# Patient Record
Sex: MALE | Race: WHITE | NOT HISPANIC OR LATINO | Employment: OTHER | URBAN - NONMETROPOLITAN AREA
[De-identification: names, ages, dates, MRNs, and addresses within clinical notes are randomized per-mention and may not be internally consistent; named-entity substitution may affect disease eponyms.]

---

## 2024-08-06 ENCOUNTER — APPOINTMENT (EMERGENCY)
Dept: RADIOLOGY | Facility: HOSPITAL | Age: 69
End: 2024-08-06
Payer: COMMERCIAL

## 2024-08-06 ENCOUNTER — HOSPITAL ENCOUNTER (EMERGENCY)
Facility: HOSPITAL | Age: 69
Discharge: HOME | End: 2024-08-06
Attending: EMERGENCY MEDICINE
Payer: COMMERCIAL

## 2024-08-06 VITALS
WEIGHT: 230.6 LBS | DIASTOLIC BLOOD PRESSURE: 82 MMHG | TEMPERATURE: 97.9 F | HEART RATE: 58 BPM | RESPIRATION RATE: 25 BRPM | OXYGEN SATURATION: 95 % | SYSTOLIC BLOOD PRESSURE: 170 MMHG

## 2024-08-06 DIAGNOSIS — T78.40XA ALLERGIC REACTION, INITIAL ENCOUNTER: Primary | ICD-10-CM

## 2024-08-06 LAB
2HR DELTA HS TROPONIN: 3 NG/L
ALBUMIN SERPL BCG-MCNC: 4.2 G/DL (ref 3.5–5)
ALP SERPL-CCNC: 243 U/L (ref 34–104)
ALT SERPL W P-5'-P-CCNC: 24 U/L (ref 7–52)
ANION GAP SERPL CALCULATED.3IONS-SCNC: 8 MMOL/L (ref 4–13)
AST SERPL W P-5'-P-CCNC: 26 U/L (ref 13–39)
ATRIAL RATE: 63 BPM
BASOPHILS # BLD AUTO: 0.01 THOUSANDS/ÂΜL (ref 0–0.1)
BASOPHILS NFR BLD AUTO: 0 % (ref 0–1)
BILIRUB SERPL-MCNC: 0.87 MG/DL (ref 0.2–1)
BNP SERPL-MCNC: 410 PG/ML (ref 0–100)
BUN SERPL-MCNC: 19 MG/DL (ref 5–25)
CALCIUM SERPL-MCNC: 9.1 MG/DL (ref 8.4–10.2)
CARDIAC TROPONIN I PNL SERPL HS: 11 NG/L
CARDIAC TROPONIN I PNL SERPL HS: 14 NG/L
CHLORIDE SERPL-SCNC: 106 MMOL/L (ref 96–108)
CO2 SERPL-SCNC: 24 MMOL/L (ref 21–32)
CREAT SERPL-MCNC: 1 MG/DL (ref 0.6–1.3)
EOSINOPHIL # BLD AUTO: 0.01 THOUSAND/ÂΜL (ref 0–0.61)
EOSINOPHIL NFR BLD AUTO: 0 % (ref 0–6)
ERYTHROCYTE [DISTWIDTH] IN BLOOD BY AUTOMATED COUNT: 18.6 % (ref 11.6–15.1)
GFR SERPL CREATININE-BSD FRML MDRD: 75 ML/MIN/1.73SQ M
GLUCOSE SERPL-MCNC: 123 MG/DL (ref 65–140)
HCT VFR BLD AUTO: 36.9 % (ref 36.5–49.3)
HGB BLD-MCNC: 11.3 G/DL (ref 12–17)
IMM GRANULOCYTES # BLD AUTO: 0.04 THOUSAND/UL (ref 0–0.2)
IMM GRANULOCYTES NFR BLD AUTO: 1 % (ref 0–2)
LYMPHOCYTES # BLD AUTO: 0.37 THOUSANDS/ÂΜL (ref 0.6–4.47)
LYMPHOCYTES NFR BLD AUTO: 13 % (ref 14–44)
MCH RBC QN AUTO: 27.9 PG (ref 26.8–34.3)
MCHC RBC AUTO-ENTMCNC: 30.6 G/DL (ref 31.4–37.4)
MCV RBC AUTO: 91 FL (ref 82–98)
MONOCYTES # BLD AUTO: 0.09 THOUSAND/ÂΜL (ref 0.17–1.22)
MONOCYTES NFR BLD AUTO: 3 % (ref 4–12)
NEUTROPHILS # BLD AUTO: 2.37 THOUSANDS/ÂΜL (ref 1.85–7.62)
NEUTS SEG NFR BLD AUTO: 83 % (ref 43–75)
NRBC BLD AUTO-RTO: 0 /100 WBCS
P AXIS: 67 DEGREES
PLATELET # BLD AUTO: 158 THOUSANDS/UL (ref 149–390)
PMV BLD AUTO: 11 FL (ref 8.9–12.7)
POTASSIUM SERPL-SCNC: 4.4 MMOL/L (ref 3.5–5.3)
PR INTERVAL: 210 MS
PROT SERPL-MCNC: 6.9 G/DL (ref 6.4–8.4)
QRS AXIS: -13 DEGREES
QRSD INTERVAL: 148 MS
QT INTERVAL: 496 MS
QTC INTERVAL: 507 MS
RBC # BLD AUTO: 4.05 MILLION/UL (ref 3.88–5.62)
SODIUM SERPL-SCNC: 138 MMOL/L (ref 135–147)
T WAVE AXIS: 40 DEGREES
VENTRICULAR RATE: 63 BPM
WBC # BLD AUTO: 2.89 THOUSAND/UL (ref 4.31–10.16)

## 2024-08-06 PROCEDURE — 99284 EMERGENCY DEPT VISIT MOD MDM: CPT

## 2024-08-06 PROCEDURE — 80053 COMPREHEN METABOLIC PANEL: CPT | Performed by: PHYSICIAN ASSISTANT

## 2024-08-06 PROCEDURE — 96374 THER/PROPH/DIAG INJ IV PUSH: CPT

## 2024-08-06 PROCEDURE — 83880 ASSAY OF NATRIURETIC PEPTIDE: CPT | Performed by: PHYSICIAN ASSISTANT

## 2024-08-06 PROCEDURE — 99284 EMERGENCY DEPT VISIT MOD MDM: CPT | Performed by: PHYSICIAN ASSISTANT

## 2024-08-06 PROCEDURE — 84484 ASSAY OF TROPONIN QUANT: CPT | Performed by: PHYSICIAN ASSISTANT

## 2024-08-06 PROCEDURE — 93005 ELECTROCARDIOGRAM TRACING: CPT

## 2024-08-06 PROCEDURE — 71045 X-RAY EXAM CHEST 1 VIEW: CPT

## 2024-08-06 PROCEDURE — 85025 COMPLETE CBC W/AUTO DIFF WBC: CPT | Performed by: PHYSICIAN ASSISTANT

## 2024-08-06 RX ORDER — PREDNISONE 20 MG/1
40 TABLET ORAL DAILY
Qty: 6 TABLET | Refills: 0 | Status: SHIPPED | OUTPATIENT
Start: 2024-08-06 | End: 2024-08-09

## 2024-08-06 RX ORDER — METHYLPREDNISOLONE SODIUM SUCCINATE 125 MG/2ML
125 INJECTION, POWDER, LYOPHILIZED, FOR SOLUTION INTRAMUSCULAR; INTRAVENOUS ONCE
Status: COMPLETED | OUTPATIENT
Start: 2024-08-06 | End: 2024-08-06

## 2024-08-06 RX ADMIN — METHYLPREDNISOLONE SODIUM SUCCINATE 125 MG: 125 INJECTION, POWDER, FOR SOLUTION INTRAMUSCULAR; INTRAVENOUS at 14:01

## 2024-08-06 NOTE — ED NOTES
Port de-accessed by SUSANNE Yepez prior to discharge.   Flushing well and giving good blood return.       Terence Elizondo  08/06/24 [id number]

## 2024-08-06 NOTE — ED PROVIDER NOTES
"History  Chief Complaint   Patient presents with    Allergic Reaction     Pt received a new chemo medication and went in to respiratory distress. Cancer center administered benadryl, hydrocortisone,and Pepcid. EMS gave albuterol in route and patient reports relief.      The patient is a 69-year-old male with a current history of colon cancer with liver metastasis, A-fib on Eliquis who presents with the chief complaint of shortness of breath and allergic reaction.  The patient states that he was at his hematology oncology appointment today and started a new chemo infusion, \"Vectibix\", this started at 1125 and finished at 1225.  He states that he started coughing during the infusion and it got worse throughout the treatment.  He started to become short of breath during this treatment and nursing staff gave Benadryl hydrocortisone IV Pepcid and EMS was called.  Had a lot of improvement from the medications.  Denies any fevers chills chest pain tongue or mouth swelling rash, abdominal pain nausea vomiting.          None       Past Medical History:   Diagnosis Date    Afib (HCC)     Colon cancer (HCC)     Hydrocele     Hypertension        Past Surgical History:   Procedure Laterality Date    COLECTOMY      PORTACATH PLACEMENT         History reviewed. No pertinent family history.  I have reviewed and agree with the history as documented.    E-Cigarette/Vaping     E-Cigarette/Vaping Substances     Social History     Tobacco Use    Smoking status: Never    Smokeless tobacco: Never   Substance Use Topics    Alcohol use: Yes    Drug use: Not Currently       Review of Systems   All other systems reviewed and are negative.      Physical Exam  Physical Exam  Vitals and nursing note reviewed.   Constitutional:       General: He is not in acute distress.     Appearance: He is well-developed.   HENT:      Head: Normocephalic and atraumatic.      Mouth/Throat:      Mouth: Oropharynx is clear and moist.   Eyes:      Extraocular " Movements: EOM normal.      Pupils: Pupils are equal, round, and reactive to light.   Cardiovascular:      Rate and Rhythm: Normal rate and regular rhythm.      Heart sounds: No murmur heard.  Pulmonary:      Effort: Pulmonary effort is normal. No respiratory distress.      Breath sounds: Normal breath sounds.   Abdominal:      General: Bowel sounds are normal.      Palpations: Abdomen is soft.      Tenderness: There is no abdominal tenderness.   Musculoskeletal:      Cervical back: Normal range of motion.   Skin:     General: Skin is warm and dry.      Capillary Refill: Capillary refill takes less than 2 seconds.   Neurological:      General: No focal deficit present.      Mental Status: He is alert and oriented to person, place, and time.   Psychiatric:         Mood and Affect: Mood and affect normal.         Behavior: Behavior normal.         Vital Signs  ED Triage Vitals   Temperature Pulse Respirations Blood Pressure SpO2   08/06/24 1331 08/06/24 1331 08/06/24 1331 08/06/24 1331 08/06/24 1331   97.9 °F (36.6 °C) 62 (!) 30 (!) 233/102 92 %      Temp Source Heart Rate Source Patient Position - Orthostatic VS BP Location FiO2 (%)   08/06/24 1331 08/06/24 1331 08/06/24 1400 08/06/24 1331 --   Temporal Monitor Lying Right arm       Pain Score       --                  Vitals:    08/06/24 1331 08/06/24 1400 08/06/24 1430 08/06/24 1448   BP: (!) 233/102 (!) 229/115 (!) 220/83 170/82   Pulse: 62 61 58 58   Patient Position - Orthostatic VS:  Lying  Sitting         Visual Acuity      ED Medications  Medications   methylPREDNISolone sodium succinate (Solu-MEDROL) injection 125 mg (125 mg Intravenous Given 8/6/24 1401)       Diagnostic Studies  Results Reviewed       Procedure Component Value Units Date/Time    HS Troponin I 2hr [[id number]]  (Normal) Collected: 08/06/24 1508    Lab Status: Final result Specimen: Blood from Line Updated: 08/06/24 1534     hs TnI 2hr 14 ng/L      Delta 2hr hsTnI 3 ng/L     B-Type  Natriuretic Peptide(BNP) [[id number]]  (Abnormal) Collected: 08/06/24 1356    Lab Status: Final result Specimen: Blood from Line Updated: 08/06/24 1455      pg/mL     HS Troponin 0hr (reflex protocol) [[id number]]  (Normal) Collected: 08/06/24 1356    Lab Status: Final result Specimen: Blood from Line Updated: 08/06/24 1425     hs TnI 0hr 11 ng/L     Comprehensive metabolic panel [[id number]]  (Abnormal) Collected: 08/06/24 1356    Lab Status: Final result Specimen: Blood from Line Updated: 08/06/24 1420     Sodium 138 mmol/L      Potassium 4.4 mmol/L      Chloride 106 mmol/L      CO2 24 mmol/L      ANION GAP 8 mmol/L      BUN 19 mg/dL      Creatinine 1.00 mg/dL      Glucose 123 mg/dL      Calcium 9.1 mg/dL      AST 26 U/L      ALT 24 U/L      Alkaline Phosphatase 243 U/L      Total Protein 6.9 g/dL      Albumin 4.2 g/dL      Total Bilirubin 0.87 mg/dL      eGFR 75 ml/min/1.73sq m     Narrative:      [employer] guidelines for Chronic Kidney Disease (CKD):     Stage 1 with normal or high GFR (GFR > 90 mL/min/1.73 square meters)    Stage 2 Mild CKD (GFR = 60-89 mL/min/1.73 square meters)    Stage 3A Moderate CKD (GFR = 45-59 mL/min/1.73 square meters)    Stage 3B Moderate CKD (GFR = 30-44 mL/min/1.73 square meters)    Stage 4 Severe CKD (GFR = 15-29 mL/min/1.73 square meters)    Stage 5 End Stage CKD (GFR <15 mL/min/1.73 square meters)  Note: GFR calculation is accurate only with a steady state creatinine    CBC and differential [[id number]]  (Abnormal) Collected: 08/06/24 1356    Lab Status: Final result Specimen: Blood from Line Updated: 08/06/24 1403     WBC 2.89 Thousand/uL      RBC 4.05 Million/uL      Hemoglobin 11.3 g/dL      Hematocrit 36.9 %      MCV 91 fL      MCH 27.9 pg      MCHC 30.6 g/dL      RDW 18.6 %      MPV 11.0 fL      Platelets 158 Thousands/uL      nRBC 0 /100 WBCs      Segmented % 83 %      Immature Grans % 1 %      Lymphocytes % 13 %      Monocytes % 3 %       "Eosinophils Relative 0 %      Basophils Relative 0 %      Absolute Neutrophils 2.37 Thousands/µL      Absolute Immature Grans 0.04 Thousand/uL      Absolute Lymphocytes 0.37 Thousands/µL      Absolute Monocytes 0.09 Thousand/µL      Eosinophils Absolute 0.01 Thousand/µL      Basophils Absolute 0.01 Thousands/µL                    XR chest 1 view portable   Final Result by David Arias MD (08/06 4026)      Mild cardiomegaly. No acute cardiopulmonary disease.            Workstation performed: [id number]                    Procedures  ECG 12 Lead Documentation Only    Date/Time: 8/6/2024 8:00 PM    Performed by: Saman Syed PA-C  Authorized by: Saman Syed PA-C    Indications / Diagnosis:  Zsob  ECG reviewed by me, the ED Provider: yes    Patient location:  ED  Previous ECG:     Previous ECG:  Unavailable  Interpretation:     Interpretation: abnormal    Rate:     ECG rate:  63    ECG rate assessment: normal    Rhythm:     Rhythm: sinus rhythm    Conduction:     Conduction: abnormal      Abnormal conduction: 1st degree    ST segments:     ST segments:  Normal           ED Course                                               Medical Decision Making  The patient is a 69-year-old male with a current history of colon cancer with liver metastasis, A-fib on Eliquis who presents with the chief complaint of shortness of breath and allergic reaction.  The patient states that he was at his hematology oncology appointment today and started a new chemo infusion, \"Vectibix\", this started at 1125 and finished at 1225.  He states that he started coughing during the infusion and it got worse throughout the treatment.  He started to become short of breath during this treatment and nursing staff gave Benadryl hydrocortisone IV Pepcid and EMS was called.  Had a lot of improvement from the medications.  Denies any fevers chills chest pain tongue or mouth swelling rash, abdominal pain nausea vomiting.    The patient " remains stable Emergency Department had improvement of symptoms upon arrival after medication    Patient's blood pressure was better on the manual blood pressure cuff.  Patient was monitored in the emergency department, the troponin was obtained x 2 unremarkable no changes.  Patient's condition similar to allergic reaction will continue small steroids prescription and patient in agreement with treatment plan.    Amount and/or Complexity of Data Reviewed  Labs: ordered. Decision-making details documented in ED Course.  Radiology: ordered and independent interpretation performed. Decision-making details documented in ED Course.  ECG/medicine tests: ordered and independent interpretation performed. Decision-making details documented in ED Course.    Risk  Prescription drug management.                 Disposition  Final diagnoses:   Allergic reaction, initial encounter     Time reflects when diagnosis was documented in both MDM as applicable and the Disposition within this note       Time User Action Codes Description Comment    8/6/2024  3:00 PM Saman Syed Add [T78.40XA] Allergic reaction, initial encounter           ED Disposition       ED Disposition   Discharge    Condition   Stable    Date/Time   Tue Aug 6, 2024  3:00 PM    Comment   Chucho Stephens discharge to home/self care.                   Follow-up Information    None         Discharge Medication List as of 8/6/2024  3:42 PM        START taking these medications    Details   predniSONE 20 mg tablet Take 2 tablets (40 mg total) by mouth daily for 3 days, Starting Tue 8/6/2024, Until Fri 8/9/2024, Normal             No discharge procedures on file.    PDMP Review       None            ED Provider  Electronically Signed by             Saman Syed PA-C  08/06/24 2001

## 2024-08-06 NOTE — ED NOTES
Patients port already accessed upon arrival. Fluids running via port upon arrival. Per provider okay to use at this time.      Terence Elizondo  08/06/24 5472

## 2025-06-04 ENCOUNTER — HOSPITAL ENCOUNTER (OUTPATIENT)
Dept: CT IMAGING | Facility: HOSPITAL | Age: 70
Discharge: HOME | End: 2025-06-04
Attending: REGISTERED NURSE
Payer: COMMERCIAL

## 2025-06-04 ENCOUNTER — APPOINTMENT (OUTPATIENT)
Dept: LAB | Facility: HOSPITAL | Age: 70
End: 2025-06-04
Payer: COMMERCIAL

## 2025-06-04 DIAGNOSIS — C20 PRIMARY MALIGNANT NEOPLASM OF RECTUM (HCC): ICD-10-CM

## 2025-06-04 DIAGNOSIS — R06.02 SOB (SHORTNESS OF BREATH): ICD-10-CM

## 2025-06-04 DIAGNOSIS — C18.9 MALIGNANT NEOPLASM OF COLON, UNSPECIFIED PART OF COLON (HCC): ICD-10-CM

## 2025-06-04 DIAGNOSIS — D50.9 IRON DEFICIENCY ANEMIA, UNSPECIFIED IRON DEFICIENCY ANEMIA TYPE: ICD-10-CM

## 2025-06-04 DIAGNOSIS — C18.9 COLON CANCER METASTASIZED TO LIVER (HCC): ICD-10-CM

## 2025-06-04 DIAGNOSIS — C78.7 COLON CANCER METASTASIZED TO LIVER (HCC): ICD-10-CM

## 2025-06-04 LAB
ALBUMIN SERPL BCG-MCNC: 3 G/DL (ref 3.5–5)
ALP SERPL-CCNC: 245 U/L (ref 34–104)
ALT SERPL W P-5'-P-CCNC: 23 U/L (ref 7–52)
ANION GAP SERPL CALCULATED.3IONS-SCNC: 6 MMOL/L (ref 4–13)
AST SERPL W P-5'-P-CCNC: 44 U/L (ref 13–39)
BASOPHILS # BLD AUTO: 0.01 THOUSANDS/ÂΜL (ref 0–0.1)
BASOPHILS NFR BLD AUTO: 0 % (ref 0–1)
BILIRUB SERPL-MCNC: 1.69 MG/DL (ref 0.2–1)
BUN SERPL-MCNC: 21 MG/DL (ref 5–25)
CALCIUM ALBUM COR SERPL-MCNC: 9.1 MG/DL (ref 8.3–10.1)
CALCIUM SERPL-MCNC: 8.3 MG/DL (ref 8.4–10.2)
CHLORIDE SERPL-SCNC: 111 MMOL/L (ref 96–108)
CO2 SERPL-SCNC: 26 MMOL/L (ref 21–32)
CREAT SERPL-MCNC: 0.91 MG/DL (ref 0.6–1.3)
D DIMER PPP FEU-MCNC: 0.77 UG/ML FEU
EOSINOPHIL # BLD AUTO: 0.11 THOUSAND/ÂΜL (ref 0–0.61)
EOSINOPHIL NFR BLD AUTO: 2 % (ref 0–6)
ERYTHROCYTE [DISTWIDTH] IN BLOOD BY AUTOMATED COUNT: 23.3 % (ref 11.6–15.1)
FERRITIN SERPL-MCNC: 781 NG/ML (ref 30–336)
FOLATE SERPL-MCNC: 17 NG/ML
GFR SERPL CREATININE-BSD FRML MDRD: 83 ML/MIN/1.73SQ M
GLUCOSE P FAST SERPL-MCNC: 112 MG/DL (ref 65–99)
HCT VFR BLD AUTO: 31.6 % (ref 36.5–49.3)
HGB BLD-MCNC: 9 G/DL (ref 12–17)
IMM GRANULOCYTES # BLD AUTO: 0.04 THOUSAND/UL (ref 0–0.2)
IMM GRANULOCYTES NFR BLD AUTO: 1 % (ref 0–2)
IRON SATN MFR SERPL: 22 % (ref 15–50)
IRON SERPL-MCNC: 60 UG/DL (ref 50–212)
LYMPHOCYTES # BLD AUTO: 0.76 THOUSANDS/ÂΜL (ref 0.6–4.47)
LYMPHOCYTES NFR BLD AUTO: 13 % (ref 14–44)
MCH RBC QN AUTO: 26.2 PG (ref 26.8–34.3)
MCHC RBC AUTO-ENTMCNC: 28.5 G/DL (ref 31.4–37.4)
MCV RBC AUTO: 92 FL (ref 82–98)
MONOCYTES # BLD AUTO: 0.42 THOUSAND/ÂΜL (ref 0.17–1.22)
MONOCYTES NFR BLD AUTO: 7 % (ref 4–12)
NEUTROPHILS # BLD AUTO: 4.59 THOUSANDS/ÂΜL (ref 1.85–7.62)
NEUTS SEG NFR BLD AUTO: 77 % (ref 43–75)
NRBC BLD AUTO-RTO: 0 /100 WBCS
PLATELET # BLD AUTO: 140 THOUSANDS/UL (ref 149–390)
PMV BLD AUTO: 9.9 FL (ref 8.9–12.7)
POTASSIUM SERPL-SCNC: 3.9 MMOL/L (ref 3.5–5.3)
PROT SERPL-MCNC: 6.6 G/DL (ref 6.4–8.4)
RBC # BLD AUTO: 3.44 MILLION/UL (ref 3.88–5.62)
SODIUM SERPL-SCNC: 143 MMOL/L (ref 135–147)
TIBC SERPL-MCNC: 267.4 UG/DL (ref 250–450)
TRANSFERRIN SERPL-MCNC: 191 MG/DL (ref 203–362)
UIBC SERPL-MCNC: 207 UG/DL (ref 155–355)
VIT B12 SERPL-MCNC: 817 PG/ML (ref 180–914)
WBC # BLD AUTO: 5.93 THOUSAND/UL (ref 4.31–10.16)

## 2025-06-04 PROCEDURE — 82728 ASSAY OF FERRITIN: CPT

## 2025-06-04 PROCEDURE — 85025 COMPLETE CBC W/AUTO DIFF WBC: CPT

## 2025-06-04 PROCEDURE — 82607 VITAMIN B-12: CPT

## 2025-06-04 PROCEDURE — 80053 COMPREHEN METABOLIC PANEL: CPT

## 2025-06-04 PROCEDURE — 83540 ASSAY OF IRON: CPT

## 2025-06-04 PROCEDURE — 82746 ASSAY OF FOLIC ACID SERUM: CPT

## 2025-06-04 PROCEDURE — 83550 IRON BINDING TEST: CPT

## 2025-06-04 PROCEDURE — 71275 CT ANGIOGRAPHY CHEST: CPT

## 2025-06-04 PROCEDURE — 85379 FIBRIN DEGRADATION QUANT: CPT

## 2025-06-04 PROCEDURE — 36415 COLL VENOUS BLD VENIPUNCTURE: CPT

## 2025-06-04 RX ADMIN — IOHEXOL 40 ML: 350 INJECTION, SOLUTION INTRAVENOUS at 09:50
